# Patient Record
Sex: MALE | Race: WHITE | Employment: UNEMPLOYED | URBAN - METROPOLITAN AREA
[De-identification: names, ages, dates, MRNs, and addresses within clinical notes are randomized per-mention and may not be internally consistent; named-entity substitution may affect disease eponyms.]

---

## 2020-02-03 ENCOUNTER — HOSPITAL ENCOUNTER (OUTPATIENT)
Dept: CT IMAGING | Age: 55
Discharge: HOME | End: 2020-02-03
Attending: NURSE PRACTITIONER
Payer: MEDICARE

## 2020-02-03 DIAGNOSIS — R59.0 LYMPHADENOPATHY OF RIGHT CERVICAL REGION: ICD-10-CM

## 2020-02-03 DIAGNOSIS — B20 HUMAN IMMUNODEFICIENCY VIRUS (HIV) DISEASE (HCC): ICD-10-CM

## 2020-02-03 PROCEDURE — 70491 CT SOFT TISSUE NECK W/DYE: CPT

## 2020-02-03 PROCEDURE — 74011000258 HC RX REV CODE- 258

## 2020-02-03 PROCEDURE — 74011636320 HC RX REV CODE- 636/320

## 2020-02-03 RX ORDER — SODIUM CHLORIDE 0.9 % (FLUSH) 0.9 %
10 SYRINGE (ML) INJECTION
Status: COMPLETED | OUTPATIENT
Start: 2020-02-03 | End: 2020-02-03

## 2020-02-03 RX ADMIN — Medication 10 ML: at 14:44

## 2020-02-03 RX ADMIN — SODIUM CHLORIDE 100 ML: 900 INJECTION, SOLUTION INTRAVENOUS at 14:44

## 2020-02-03 RX ADMIN — IOPAMIDOL 80 ML: 755 INJECTION, SOLUTION INTRAVENOUS at 14:44

## 2020-02-14 ENCOUNTER — HOSPITAL ENCOUNTER (OUTPATIENT)
Dept: ULTRASOUND IMAGING | Age: 55
Discharge: HOME | End: 2020-02-14
Attending: NURSE PRACTITIONER
Payer: MEDICARE

## 2020-02-14 VITALS
HEART RATE: 68 BPM | DIASTOLIC BLOOD PRESSURE: 84 MMHG | HEIGHT: 73 IN | BODY MASS INDEX: 23.06 KG/M2 | RESPIRATION RATE: 16 BRPM | SYSTOLIC BLOOD PRESSURE: 123 MMHG | WEIGHT: 174 LBS

## 2020-02-14 DIAGNOSIS — L04.9 NECROTIZING INFLAMMATION OF LYMPH NODE: ICD-10-CM

## 2020-02-14 PROCEDURE — 74011000250 HC RX REV CODE- 250: Performed by: PHYSICIAN ASSISTANT

## 2020-02-14 PROCEDURE — 88305 TISSUE EXAM BY PATHOLOGIST: CPT

## 2020-02-14 PROCEDURE — 38505 NEEDLE BIOPSY LYMPH NODES: CPT

## 2020-02-14 PROCEDURE — 88173 CYTOPATH EVAL FNA REPORT: CPT

## 2020-02-14 RX ORDER — LIDOCAINE HYDROCHLORIDE 20 MG/ML
2-20 INJECTION, SOLUTION INFILTRATION; PERINEURAL ONCE
Status: COMPLETED | OUTPATIENT
Start: 2020-02-14 | End: 2020-02-14

## 2020-02-14 RX ADMIN — LIDOCAINE HYDROCHLORIDE 3 ML: 20 INJECTION, SOLUTION INFILTRATION; PERINEURAL at 13:35

## 2020-02-14 NOTE — DISCHARGE INSTRUCTIONS
[place] [address]  Department of Interventional Radiology  [address] [place] [address] [address] [facility]  [phone] Office  [phone] Fax    BIOPSY DISCHARGE INSTRUCTIONS    General Instructions:     A biopsy is the removal of a small piece of tissue for microscopic examination or testing. Healthy tissue can be obtained for the purpose of tissue-type matching for transplants. Unhealthy tissues are more commonly biopsied to diagnose disease. General Biopsy:     A mass can grow in any area of the body, and we are taking a specimen as ordered by your doctor. The risks are the same. They include bleeding, pain, and infection. Home Care Instructions: You may resume your regular diet and medication regimen. Do not drink alcohol, drive, or make any important legal decisions in the next 24 hours. Do not lift anything heavier than a gallon of milk until the soreness goes away. You may use over the counter acetaminophen or ibuprofen for the soreness. You may apply an ice pack to the affected area for 20-30 minutes at time for the first 24 hours. After that, you may apply a heat pack. Call If: You should call your Physician and/or the Radiology Nurse if you have any questions or concerns about the biopsy site. Call if you should have increased pain, fever, redness, drainage, or bleeding more than a small spot on the bandage. Follow-Up Instructions: Please see your ordering doctor as he/she has requested. To Reach Us: If you have any questions about your procedure, please call the Interventional Radiology department at [phone]. After business hours (5pm) and weekends, call the answering service at [phone] and ask for the Radiologist on call to be paged.      Interventional Radiology General Nurse Discharge    After general anesthesia or intravenous sedation, for 24 hours or while taking prescription Narcotics:  · Limit your activities  · Do not drive and operate hazardous machinery  · Do not make important personal or business decisions  · Do  not drink alcoholic beverages  · If you have not urinated within 8 hours after discharge, please contact your surgeon on call. * Please give a list of your current medications to your Primary Care Provider. * Please update this list whenever your medications are discontinued, doses are     changed, or new medications (including over-the-counter products) are added. * Please carry medication information at all times in case of emergency situations. These are general instructions for a healthy lifestyle:    No smoking/ No tobacco products/ Avoid exposure to second hand smoke  Surgeon General's Warning:  Quitting smoking now greatly reduces serious risk to your health. Obesity, smoking, and sedentary lifestyle greatly increases your risk for illness  A healthy diet, regular physical exercise & weight monitoring are important for maintaining a healthy lifestyle    You may be retaining fluid if you have a history of heart failure or if you experience any of the following symptoms:  Weight gain of 3 pounds or more overnight or 5 pounds in a week, increased swelling in our hands or feet or shortness of breath while lying flat in bed. Please call your doctor as soon as you notice any of these symptoms; do not wait until your next office visit. Recognize signs and symptoms of STROKE:  F-face looks uneven    A-arms unable to move or move unevenly    S-speech slurred or non-existent    T-time-call 911 as soon as signs and symptoms begin-DO NOT go       Back to bed or wait to see if you get better-TIME IS BRAIN.              Patient Signature:  Date: 2/14/2020  Discharging Nurse: Duke Barragan RN

## 2020-02-14 NOTE — PROCEDURES
Department of Interventional Radiology  [phone]        Interventional Radiology Brief Procedure Note    Patient: Enrique Jiang MRN: [id number]  SSN: [id number]    YOB: 1965  Age: 47 y.o.   Sex: male      Date of Procedure: 2/14/2020    Pre-Procedure Diagnosis: Right neck lymphadenopathy    Post-Procedure Diagnosis: SAME    Procedure(s): Image Guided Biopsy    Brief Description of Procedure: as above    Performed By: Nuzhat Quevedo MD     Assistants: None    Anesthesia:lidocaine    Estimated Blood Loss: Less than 10ml    Specimens:  Pathology    Implants:  None    Findings: right neck LN    Complications: None    Recommendations: routine post care     Follow Up: as needed    Signed By: Nuzhat Quevedo MD     February 14, 2020

## 2024-01-22 LAB
BASOPHILS # BLD: 0 K/UL (ref 0–0.2)
BASOPHILS NFR BLD: 0 % (ref 0–2)
DIFFERENTIAL METHOD BLD: ABNORMAL
EOSINOPHIL # BLD: 0.1 K/UL (ref 0–0.8)
EOSINOPHIL NFR BLD: 1 % (ref 0.5–7.8)
ERYTHROCYTE [DISTWIDTH] IN BLOOD BY AUTOMATED COUNT: 13.4 % (ref 11.9–14.6)
HCT VFR BLD AUTO: 43 % (ref 41.1–50.3)
HGB BLD-MCNC: 14 G/DL (ref 13.6–17.2)
IMM GRANULOCYTES # BLD AUTO: 0 K/UL (ref 0–0.5)
IMM GRANULOCYTES NFR BLD AUTO: 0 % (ref 0–5)
LYMPHOCYTES # BLD: 0.6 K/UL (ref 0.5–4.6)
LYMPHOCYTES NFR BLD: 6 % (ref 13–44)
MCH RBC QN AUTO: 34.1 PG (ref 26.1–32.9)
MCHC RBC AUTO-ENTMCNC: 32.6 G/DL (ref 31.4–35)
MCV RBC AUTO: 104.6 FL (ref 82–102)
MONOCYTES # BLD: 1 K/UL (ref 0.1–1.3)
MONOCYTES NFR BLD: 9 % (ref 4–12)
NEUTS SEG # BLD: 9.5 K/UL (ref 1.7–8.2)
NEUTS SEG NFR BLD: 84 % (ref 43–78)
NRBC # BLD: 0 K/UL (ref 0–0.2)
PLATELET # BLD AUTO: 276 K/UL (ref 150–450)
PMV BLD AUTO: 11 FL (ref 9.4–12.3)
RBC # BLD AUTO: 4.11 M/UL (ref 4.23–5.6)
WBC # BLD AUTO: 11.2 K/UL (ref 4.3–11.1)

## 2024-01-30 ENCOUNTER — HOSPITAL ENCOUNTER (EMERGENCY)
Age: 59
Discharge: HOME | End: 2024-01-30
Attending: EMERGENCY MEDICINE
Payer: MEDICARE

## 2024-01-30 VITALS
HEIGHT: 73 IN | DIASTOLIC BLOOD PRESSURE: 88 MMHG | SYSTOLIC BLOOD PRESSURE: 118 MMHG | WEIGHT: 130 LBS | BODY MASS INDEX: 17.23 KG/M2 | RESPIRATION RATE: 20 BRPM | HEART RATE: 98 BPM | TEMPERATURE: 97.4 F | OXYGEN SATURATION: 96 %

## 2024-01-30 DIAGNOSIS — T83.9XXD FOLEY CATHETER PROBLEM, SUBSEQUENT ENCOUNTER: Primary | ICD-10-CM

## 2024-01-30 PROCEDURE — 51700 IRRIGATION OF BLADDER: CPT

## 2024-01-30 PROCEDURE — 99284 EMERGENCY DEPT VISIT MOD MDM: CPT

## 2024-01-30 RX ORDER — VALBENAZINE 40 MG/1
80 CAPSULE ORAL DAILY
COMMUNITY
Start: 2022-03-21

## 2024-01-30 RX ORDER — TAMSULOSIN HYDROCHLORIDE 0.4 MG/1
0.4 CAPSULE ORAL
COMMUNITY
Start: 2024-01-02 | End: 2024-02-01

## 2024-01-30 RX ORDER — EMTRICITABINE AND TENOFOVIR ALAFENAMIDE 200; 25 MG/1; MG/1
1 TABLET ORAL DAILY
COMMUNITY
Start: 2023-11-20

## 2024-01-30 RX ORDER — ROSUVASTATIN CALCIUM 20 MG/1
20 TABLET, COATED ORAL
COMMUNITY
Start: 2023-12-04 | End: 2024-12-03

## 2024-01-30 ASSESSMENT — PAIN - FUNCTIONAL ASSESSMENT: PAIN_FUNCTIONAL_ASSESSMENT: NONE - DENIES PAIN

## 2024-01-30 ASSESSMENT — LIFESTYLE VARIABLES
HOW MANY STANDARD DRINKS CONTAINING ALCOHOL DO YOU HAVE ON A TYPICAL DAY: PATIENT DOES NOT DRINK
HOW MANY STANDARD DRINKS CONTAINING ALCOHOL DO YOU HAVE ON A TYPICAL DAY: PATIENT DOES NOT DRINK
HOW OFTEN DO YOU HAVE A DRINK CONTAINING ALCOHOL: NEVER

## 2024-01-30 NOTE — ED PROVIDER NOTES
Emergency Department Provider Note       PCP: Amina Etienne MD   Age: 58 y.o.   Sex: male     DISPOSITION Decision To Discharge 01/30/2024 10:17:43 AM       ICD-10-CM    1. Garza catheter problem, subsequent encounter  T83.9XXD           Medical Decision Making     Complexity of Problems Addressed:  1 acute recurrent illness    Data Reviewed and Analyzed:  I independently ordered and reviewed each unique test.             Discussion of management or test interpretation.    DDx:  Urinary retention, malfunction urinary catheter, UTI,        Risk of Complications and/or Morbidity of Patient Management:  Shared medical decision making was utilized in creating the patients health plan today.         History       58-year-old male with a past medical history of HIV presents to the ED with complaints of an occluded Garza catheter.  Sister at bedside states she has seen leakage around the catheter and minimal drainage into the bag.  His current catheter was placed roughly 1 month ago.  Patient has a follow-up urology appointment on Friday with Kaylee. Patient denies any hematuria or dysuria.  He is currently on antibiotics for a UTI.  Patient denies any fever chills abdominal pain nausea vomiting diarrhea.    The history is provided by the patient and a relative.        Review of Systems    Physical Exam     Vitals signs and nursing note reviewed:  Vitals:    01/30/24 0929   BP: 127/65   Pulse: (!) 113   Resp: 20   Temp: 97.4 °F (36.3 °C)   TempSrc: Axillary   SpO2: 96%   Weight: 59 kg (130 lb)   Height: 1.854 m (6' 0.99\")      Physical Exam     GENERAL:The patient has Body mass index is 17.16 kg/m².  No acute distress.  Cachectic appearing  VITAL SIGNS: Heart rate, blood pressure, respiratory rate reviewed as recorded in  nurse's notes  EYES: Pupils reactive. Extraocular motion intact. No conjunctival redness or drainage.  NOSE: Bilateral erythema with thin drainage appreciated. No epistaxis present  MOUTH: Decreased

## 2024-01-31 ENCOUNTER — HOSPITAL ENCOUNTER (EMERGENCY)
Age: 59
Discharge: HOME | End: 2024-01-31
Attending: EMERGENCY MEDICINE
Payer: MEDICARE

## 2024-01-31 VITALS
OXYGEN SATURATION: 98 % | SYSTOLIC BLOOD PRESSURE: 111 MMHG | DIASTOLIC BLOOD PRESSURE: 84 MMHG | WEIGHT: 130 LBS | BODY MASS INDEX: 17.23 KG/M2 | RESPIRATION RATE: 18 BRPM | TEMPERATURE: 98 F | HEIGHT: 73 IN | HEART RATE: 74 BPM

## 2024-01-31 DIAGNOSIS — T83.9XXA COMPLICATION OF FOLEY CATHETER, INITIAL ENCOUNTER (HCC): Primary | ICD-10-CM

## 2024-01-31 PROCEDURE — 99283 EMERGENCY DEPT VISIT LOW MDM: CPT

## 2024-01-31 ASSESSMENT — PAIN SCALES - GENERAL
PAINLEVEL_OUTOF10: 0
PAINLEVEL_OUTOF10: 0

## 2024-01-31 ASSESSMENT — PAIN - FUNCTIONAL ASSESSMENT: PAIN_FUNCTIONAL_ASSESSMENT: 0-10

## 2024-01-31 NOTE — ED PROVIDER NOTES
Emergency Department Provider Note       PCP: Amina Etienne MD   Age: 58 y.o.   Sex: male     DISPOSITION Decision To Discharge 01/31/2024 11:51:57 AM       ICD-10-CM    1. Complication of Garza catheter, initial encounter (HCC)  T83.9XXA           Medical Decision Making     Complexity of Problems Addressed:  1 or more acute illnesses that pose a threat to life or bodily function.     Data Reviewed and Analyzed:  I independently ordered and reviewed each unique test.  I reviewed external records: ED visit      Discussion of management or test interpretation.  Garza changed. Not leaking. Doing well. Will discharge home.  Complete course of antibiotic. Follow up with Urologist on Friday as scheduled              Risk of Complications and/or Morbidity of Patient Management:  Shared medical decision making was utilized in creating the patients health plan today.    History       HPI  58 male here with concern for Garza catheter leaking.  He wears a chronic indwelling Garza catheter.  Was seen here yesterday.  They were able to flush it through.  Today stated it was leaking around tip of the penis onto his pants.  He is on course of antibiotic and, will complete in the next 3 days.  Denies any fever, flank pain.  His current Garza is 1 month old.  Review of Systems   Genitourinary:  Negative for difficulty urinating, dysuria, penile discharge and penile pain.       Physical Exam     Vitals signs and nursing note reviewed:  Vitals:    01/31/24 0950   BP: 92/71   Pulse: (!) 103   Resp: 14   Temp: 98 °F (36.7 °C)   TempSrc: Oral   SpO2: 97%   Weight: 59 kg (130 lb)   Height: 1.854 m (6' 1\")      Physical Exam  Vitals and nursing note reviewed.   Constitutional:       General: He is not in acute distress.     Appearance: Normal appearance. He is not ill-appearing.   HENT:      Head: Normocephalic.   Eyes:      Conjunctiva/sclera: Conjunctivae normal.   Cardiovascular:      Rate and Rhythm: Normal rate.      Pulses:

## 2024-01-31 NOTE — ED NOTES
I have reviewed discharge instructions with the patient.  The patient verbalized understanding.    Patient left ED via Discharge Method: ambulatory to Home with caregiver  Opportunity for questions and clarification provided.       Patient given 0 scripts.         To continue your aftercare when you leave the hospital, you may receive an automated call from our care team to check in on how you are doing.  This is a free service and part of our promise to provide the best care and service to meet your aftercare needs.” If you have questions, or wish to unsubscribe from this service please call [phone].  Thank you for Choosing our [facility] Emergency Department.

## 2024-05-09 ENCOUNTER — TRANSCRIBE ORDERS (OUTPATIENT)
Dept: SCHEDULING | Age: 59
End: 2024-05-09

## 2024-05-09 DIAGNOSIS — N28.89 RENAL MASS: Primary | ICD-10-CM

## 2024-05-16 ENCOUNTER — HOSPITAL ENCOUNTER (OUTPATIENT)
Dept: CT IMAGING | Age: 59
Discharge: HOME | End: 2024-05-16
Payer: MEDICARE

## 2024-05-16 DIAGNOSIS — N28.89 RENAL MASS: ICD-10-CM

## 2024-05-16 PROCEDURE — 6360000004 HC RX CONTRAST MEDICATION: Performed by: SURGERY

## 2024-05-16 PROCEDURE — 74170 CT ABD WO CNTRST FLWD CNTRST: CPT

## 2024-05-16 RX ADMIN — IOPAMIDOL 100 ML: 755 INJECTION, SOLUTION INTRAVENOUS at 10:21

## 2024-08-05 ENCOUNTER — HOSPITAL ENCOUNTER (OUTPATIENT)
Dept: GENERAL RADIOLOGY | Age: 59
Discharge: HOME | End: 2024-08-08
Payer: MEDICARE

## 2024-08-05 DIAGNOSIS — C10.9 OROPHARYNGEAL CANCER (HCC): ICD-10-CM

## 2024-08-05 DIAGNOSIS — R13.10 DYSPHAGIA, UNSPECIFIED TYPE: ICD-10-CM

## 2024-08-05 PROCEDURE — 2500000003 HC RX 250 WO HCPCS: Performed by: NURSE PRACTITIONER

## 2024-08-05 PROCEDURE — 92611 MOTION FLUOROSCOPY/SWALLOW: CPT

## 2024-08-05 PROCEDURE — 74230 X-RAY XM SWLNG FUNCJ C+: CPT

## 2024-08-05 RX ADMIN — BARIUM SULFATE 15 ML: 0.81 POWDER, FOR SUSPENSION ORAL at 13:45

## 2024-08-05 RX ADMIN — BARIUM SULFATE 15 ML: 400 PASTE ORAL at 13:45

## 2024-08-05 RX ADMIN — BARIUM SULFATE 30 ML: 400 SUSPENSION ORAL at 13:45

## 2024-08-05 NOTE — PROGRESS NOTES
Pipo Parks  : 1965  Primary: [facility] Dual Complete  Secondary: [place]  MRN: [id number] [facility] RADIOLOGY  [address] [place] [address]  Phone: [phone]    Visit Info:    Date 2024   SPEECH LANGUAGE PATHOLOGY:   MODIFIED BARIUM SWALLOW STUDY     Appt Desk   Episode      Treatment Diagnosis:    Dysphagia, unspecified type  Oropharyngeal cancer (HCC)    Medical/Referring Diagnosis:  Dysphagia, unspecified type [R13.10]  Oropharyngeal cancer (HCC) [C10.9]  Referring Physician:  Bryant Coronel, APR*  Radiologist: Dr. Francois  Fluoroscopy completed by: Dr. Priscila ALEGRIA Orders: Modified Barium Swallow  Date of Onset:  No data recorded   Allergies:  Darunavir, Olanzapine, Ritonavir, Sulfa antibiotics, and Valproic acid    PAST MEDICAL HISTORY:   Mr. Parks is a 58 y.o. male who  has a past medical history of Ill-defined condition.  He also  has a past surgical history that includes Chest surgery; Appendectomy; and US BIOPSY LYMPH NODE (2020).    ASSESSMENT/PLAN OF CARE   Based on the objective data described below, Mr. Parks presents with moderate-severe oropharyngeal dysphagia. Today's study was improved from previous Modified Barium Swallow Study completed 24, patient has been working with home health speech therapy. Patient uses PEG as primary but eats pureed textures for pleasure.     Patient with prolonged bolus manipulation. Delayed swallow trigger at the level of the pyriform sinuses. Incomplete epiglottic inversion and hyolaryngeal movement resulting in poor airway protection. Patient with gross silent aspiration of thin liquid via teaspoon. Moved to half teaspoon of pudding, where the majority of the bolus remained in the valleculae despite attempts at multiple swallows. Suctioned bolus and patient able to cough the majority up. Trialed applesauce as patient reports eating at home, improved bolus clearance as opposed to pudding, without airway